# Patient Record
Sex: FEMALE | Race: WHITE
[De-identification: names, ages, dates, MRNs, and addresses within clinical notes are randomized per-mention and may not be internally consistent; named-entity substitution may affect disease eponyms.]

---

## 2017-05-17 ENCOUNTER — HOSPITAL ENCOUNTER (OUTPATIENT)
Dept: HOSPITAL 53 - M LAB | Age: 57
End: 2017-05-17
Attending: INTERNAL MEDICINE
Payer: OTHER GOVERNMENT

## 2017-05-17 DIAGNOSIS — R74.8: Primary | ICD-10-CM

## 2017-05-17 LAB
ALBUMIN SERPL BCG-MCNC: 4.3 GM/DL (ref 3.2–5.2)
ALBUMIN/GLOB SERPL: 1.39 {RATIO} (ref 1–1.93)
ALP SERPL-CCNC: 76 U/L (ref 45–117)
ALT SERPL W P-5'-P-CCNC: 40 U/L (ref 12–78)
AST SERPL-CCNC: 12 U/L (ref 15–37)
BILIRUB CONJ SERPL-MCNC: 0.1 MG/DL (ref 0–0.2)
BILIRUB SERPL-MCNC: 0.3 MG/DL (ref 0.2–1)
BUN SERPL-MCNC: 16 MG/DL (ref 7–18)
CREAT SERPL-MCNC: 0.61 MG/DL (ref 0.55–1.02)
GFR SERPL CREATININE-BSD FRML MDRD: > 60 ML/MIN/{1.73_M2} (ref 51–?)
GGT SERPL-CCNC: 66 U/L (ref 5–55)
INR PPP: 0.93
IRON SATN MFR SERPL: 12.1 % (ref 13.2–37.4)
PROT SERPL-MCNC: 7.4 GM/DL (ref 6.4–8.2)
TIBC SERPL-MCNC: 404 UG/DL (ref 250–450)

## 2017-05-22 ENCOUNTER — HOSPITAL ENCOUNTER (OUTPATIENT)
Dept: HOSPITAL 53 - M RAD | Age: 57
End: 2017-05-22
Attending: INTERNAL MEDICINE
Payer: OTHER GOVERNMENT

## 2017-05-22 DIAGNOSIS — R74.8: ICD-10-CM

## 2017-05-22 DIAGNOSIS — R93.3: Primary | ICD-10-CM

## 2017-05-22 PROCEDURE — 74178 CT ABD&PLV WO CNTR FLWD CNTR: CPT

## 2017-05-22 NOTE — REP
CT abdomen pelvis without and with IV contrast:  With oral contrast:

 

History:  Abnormal findings diagnostic imaging.  Elevated liver function

studies.

 

CT contrast dose:  100 mL of Isovue 370 is administered intravenously.

 

CT findings:  Preliminary digital  radiograph demonstrates an unremarkable

bowel gas pattern.  The lung bases are clear.  The liver and the spleen are

normal in size homogeneous in texture on pre and postcontrast CT acquisition.  No

adrenal lesion is seen on either side.  The kidneys enhance symmetrically and are

morphologically intact.  Renal delay images show no filling defect in the

collecting system on either side.  A normal appendix is seen in the right lower

abdomen.  No pancreatic abnormality is observed.  Small and large intestinal

bowel loops are unremarkable.  Uterus is tipped to the right and is unremarkable.

No pelvic mass, cyst, or adenopathy.  No bony destructive lesion is seen.  No

abdominal wall defect is observed.

 

Impression:

 

Negative CT abdomen and pelvis without and with IV contrast.  No hepatic

abnormality is seen.

 

 

Signed by

Rick Parker MD 05/23/2017 05:06 P

## 2018-02-21 ENCOUNTER — HOSPITAL ENCOUNTER (EMERGENCY)
Dept: HOSPITAL 53 - M ED | Age: 58
Discharge: HOME | End: 2018-02-21
Payer: COMMERCIAL

## 2018-02-21 DIAGNOSIS — N39.0: Primary | ICD-10-CM

## 2018-02-21 DIAGNOSIS — N13.4: ICD-10-CM

## 2018-02-21 DIAGNOSIS — E11.9: ICD-10-CM

## 2018-02-21 DIAGNOSIS — Z79.82: ICD-10-CM

## 2018-02-21 DIAGNOSIS — Z79.899: ICD-10-CM

## 2018-02-21 DIAGNOSIS — Z87.891: ICD-10-CM

## 2018-02-21 DIAGNOSIS — N13.2: ICD-10-CM

## 2018-02-21 LAB
ALBUMIN/GLOBULIN RATIO: 1.21 (ref 1–1.93)
ALBUMIN: 4.1 GM/DL (ref 3.2–5.2)
ALKALINE PHOSPHATASE: 73 U/L (ref 45–117)
ALT SERPL W P-5'-P-CCNC: 33 U/L (ref 12–78)
ANION GAP: 10 MEQ/L (ref 8–16)
AST SERPL-CCNC: 13 U/L (ref 7–37)
BASO #: 0.1 10^3/UL (ref 0–0.2)
BASO %: 0.6 % (ref 0–1)
BILIRUB CONJ SERPL-MCNC: < 0.1 MG/DL (ref 0–0.2)
BILIRUBIN,TOTAL: 0.3 MG/DL (ref 0.2–1)
BLOOD UREA NITROGEN: 14 MG/DL (ref 7–18)
CALCIUM LEVEL: 9.2 MG/DL (ref 8.5–10.1)
CARBON DIOXIDE LEVEL: 23 MEQ/L (ref 21–32)
CHLORIDE LEVEL: 107 MEQ/L (ref 98–107)
CREATININE FOR GFR: 0.92 MG/DL (ref 0.55–1.3)
EOS #: 0.2 10^3/UL (ref 0–0.5)
EOSINOPHIL NFR BLD AUTO: 1.8 % (ref 0–3)
GFR SERPL CREATININE-BSD FRML MDRD: > 60 ML/MIN/{1.73_M2} (ref 51–?)
GLUCOSE, FASTING: 243 MG/DL (ref 70–100)
HEMATOCRIT: 39.1 % (ref 36–47)
HEMOGLOBIN: 12.8 G/DL (ref 12–16)
IMMATURE GRANULOCYTE %: 0.2 % (ref 0–3)
LEUKOCYTE ESTERASE UR AUTO RFX: (no result)
LIPASE: 243 U/L (ref 73–393)
LYMPH #: 3.2 10^3/UL (ref 1.5–4.5)
LYMPH %: 36.7 % (ref 24–44)
MEAN CORPUSCULAR HEMOGLOBIN: 27.3 PG (ref 27–33)
MEAN CORPUSCULAR HGB CONC: 32.7 G/DL (ref 32–36.5)
MEAN CORPUSCULAR VOLUME: 83.4 FL (ref 80–96)
MONO #: 0.5 10^3/UL (ref 0–0.8)
MONO %: 5.7 % (ref 0–5)
NEUTROPHILS #: 4.8 10^3/UL (ref 1.8–7.7)
NEUTROPHILS %: 55 % (ref 36–66)
NRBC BLD AUTO-RTO: 0 % (ref 0–0)
PLATELET COUNT, AUTOMATED: 298 10^3/UL (ref 150–450)
POTASSIUM SERUM: 3.8 MEQ/L (ref 3.5–5.1)
RED BLOOD COUNT: 4.69 10^6/UL (ref 4–5.4)
RED CELL DISTRIBUTION WIDTH: 13 % (ref 11.5–14.5)
SODIUM LEVEL: 140 MEQ/L (ref 136–145)
SPECIFIC GRAVITY UR AUTO RFX: 1.02 (ref 1–1.03)
SQUAM EPITHELIAL CELL UR AURFX: 0 /HPF (ref 0–6)
TOTAL PROTEIN: 7.5 GM/DL (ref 6.4–8.2)
WHITE BLOOD COUNT: 8.7 10^3/UL (ref 4–10)

## 2018-02-21 RX ADMIN — KETOROLAC TROMETHAMINE 1 MG: 30 INJECTION, SOLUTION INTRAMUSCULAR at 17:31

## 2018-02-21 RX ADMIN — CEFTRIAXONE SODIUM 1 MLS/HR: 100 INJECTION, POWDER, FOR SOLUTION INTRAVENOUS at 20:09

## 2018-02-21 RX ADMIN — SODIUM CHLORIDE 1 MLS/HR: 9 INJECTION, SOLUTION INTRAVENOUS at 17:30

## 2018-06-15 ENCOUNTER — HOSPITAL ENCOUNTER (OUTPATIENT)
Dept: HOSPITAL 53 - M LAB REF | Age: 58
End: 2018-06-15
Attending: PHYSICIAN ASSISTANT
Payer: COMMERCIAL

## 2018-06-15 DIAGNOSIS — N39.0: Primary | ICD-10-CM

## 2018-06-15 LAB
APPEARANCE, URINE: (no result)
BACTERIA UR QL AUTO: (no result)
BILIRUBIN, URINE AUTO: NEGATIVE
BLOOD, URINE BLOOD: (no result)
GLUCOSE, URINE (UA) AUTO: (no result) MG/DL
KETONE, URINE AUTO: NEGATIVE MG/DL
LEUKOCYTE ESTERASE UR QL STRIP.AUTO: (no result)
NITRITE, URINE AUTO: POSITIVE
PH,URINE: 5 UNITS (ref 5–9)
PROT UR QL STRIP.AUTO: NEGATIVE MG/DL
RBC, URINE AUTO: 11 /HPF (ref 0–3)
SPECIFIC GRAVITY URINE AUTO: 1.02 (ref 1–1.03)
SQUAMOUS #/AREA URNS AUTO: 0 /HPF (ref 0–6)
UROBILINOGEN, URINE AUTO: 0.2 MG/DL (ref 0–2)
WBC, URINE AUTO: (no result) /HPF (ref 0–3)

## 2019-12-20 ENCOUNTER — HOSPITAL ENCOUNTER (OUTPATIENT)
Dept: HOSPITAL 53 - M LAB REF | Age: 59
End: 2019-12-20
Attending: PHYSICIAN ASSISTANT
Payer: COMMERCIAL

## 2019-12-20 DIAGNOSIS — R30.0: Primary | ICD-10-CM

## 2020-12-14 ENCOUNTER — HOSPITAL ENCOUNTER (OUTPATIENT)
Dept: HOSPITAL 53 - M LABSMTC | Age: 60
End: 2020-12-14
Attending: PEDIATRICS
Payer: SELF-PAY

## 2020-12-14 DIAGNOSIS — Z20.828: Primary | ICD-10-CM

## 2022-10-18 ENCOUNTER — HOSPITAL ENCOUNTER (OUTPATIENT)
Dept: HOSPITAL 53 - M WHC | Age: 62
End: 2022-10-18
Attending: STUDENT IN AN ORGANIZED HEALTH CARE EDUCATION/TRAINING PROGRAM
Payer: COMMERCIAL

## 2022-10-18 DIAGNOSIS — Z12.31: Primary | ICD-10-CM

## 2023-08-06 ENCOUNTER — HOSPITAL ENCOUNTER (EMERGENCY)
Dept: HOSPITAL 53 - M ED | Age: 63
LOS: 1 days | Discharge: HOME | End: 2023-08-07
Payer: COMMERCIAL

## 2023-08-06 VITALS — HEIGHT: 66 IN | BODY MASS INDEX: 30.58 KG/M2 | WEIGHT: 190.26 LBS

## 2023-08-06 DIAGNOSIS — K76.0: ICD-10-CM

## 2023-08-06 DIAGNOSIS — M54.50: ICD-10-CM

## 2023-08-06 DIAGNOSIS — E11.9: ICD-10-CM

## 2023-08-06 DIAGNOSIS — Z87.891: ICD-10-CM
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
## 2023-08-06 DIAGNOSIS — S00.81XA: ICD-10-CM

## 2023-08-06 DIAGNOSIS — S50.12XA: Primary | ICD-10-CM

## 2023-08-06 DIAGNOSIS — I25.10: ICD-10-CM

## 2023-08-06 DIAGNOSIS — R93.421: ICD-10-CM

## 2023-08-06 DIAGNOSIS — I10: ICD-10-CM

## 2023-08-06 DIAGNOSIS — R93.89: ICD-10-CM

## 2023-08-06 DIAGNOSIS — R16.0: ICD-10-CM

## 2023-08-06 DIAGNOSIS — Z79.82: ICD-10-CM

## 2023-08-06 DIAGNOSIS — Z79.899: ICD-10-CM

## 2023-08-06 DIAGNOSIS — M43.16: ICD-10-CM

## 2023-08-06 LAB
BASOPHILS # BLD AUTO: 0 10^3/UL (ref 0–0.2)
BASOPHILS NFR BLD AUTO: 0.4 % (ref 0–1)
EOSINOPHIL # BLD AUTO: 0 10^3/UL (ref 0–0.5)
EOSINOPHIL NFR BLD AUTO: 0.4 % (ref 0–3)
HCT VFR BLD AUTO: 39.6 % (ref 36–47)
HGB BLD-MCNC: 13.1 G/DL (ref 12–15.5)
LYMPHOCYTES # BLD AUTO: 2.6 10^3/UL (ref 1.5–5)
LYMPHOCYTES NFR BLD AUTO: 26.3 % (ref 24–44)
MCH RBC QN AUTO: 27.7 PG (ref 27–33)
MCHC RBC AUTO-ENTMCNC: 33.1 G/DL (ref 32–36.5)
MCV RBC AUTO: 83.7 FL (ref 80–96)
MONOCYTES # BLD AUTO: 0.6 10^3/UL (ref 0–0.8)
MONOCYTES NFR BLD AUTO: 6.3 % (ref 2–8)
NEUTROPHILS # BLD AUTO: 6.5 10^3/UL (ref 1.5–8.5)
NEUTROPHILS NFR BLD AUTO: 66.3 % (ref 36–66)
PLATELET # BLD AUTO: 312 10^3/UL (ref 150–450)
RBC # BLD AUTO: 4.73 10^6/UL (ref 4–5.4)
WBC # BLD AUTO: 9.8 10^3/UL (ref 4–10)

## 2023-08-06 PROCEDURE — 73090 X-RAY EXAM OF FOREARM: CPT

## 2023-08-06 PROCEDURE — 72125 CT NECK SPINE W/O DYE: CPT

## 2023-08-06 PROCEDURE — 85025 COMPLETE CBC W/AUTO DIFF WBC: CPT

## 2023-08-06 PROCEDURE — 93041 RHYTHM ECG TRACING: CPT

## 2023-08-06 PROCEDURE — 90715 TDAP VACCINE 7 YRS/> IM: CPT

## 2023-08-06 PROCEDURE — 73060 X-RAY EXAM OF HUMERUS: CPT

## 2023-08-06 PROCEDURE — 94760 N-INVAS EAR/PLS OXIMETRY 1: CPT

## 2023-08-06 PROCEDURE — 70450 CT HEAD/BRAIN W/O DYE: CPT

## 2023-08-06 PROCEDURE — 73130 X-RAY EXAM OF HAND: CPT

## 2023-08-06 PROCEDURE — 71260 CT THORAX DX C+: CPT

## 2023-08-06 PROCEDURE — 72131 CT LUMBAR SPINE W/O DYE: CPT

## 2023-08-06 PROCEDURE — 80047 BASIC METABLC PNL IONIZED CA: CPT

## 2023-08-06 PROCEDURE — 90471 IMMUNIZATION ADMIN: CPT

## 2023-08-06 PROCEDURE — 72128 CT CHEST SPINE W/O DYE: CPT

## 2023-08-06 PROCEDURE — 74177 CT ABD & PELVIS W/CONTRAST: CPT

## 2023-08-06 PROCEDURE — 99285 EMERGENCY DEPT VISIT HI MDM: CPT

## 2023-08-06 PROCEDURE — 73110 X-RAY EXAM OF WRIST: CPT

## 2023-08-07 VITALS — TEMPERATURE: 98.2 F | SYSTOLIC BLOOD PRESSURE: 146 MMHG | DIASTOLIC BLOOD PRESSURE: 77 MMHG | OXYGEN SATURATION: 97 %

## 2023-09-21 ENCOUNTER — HOSPITAL ENCOUNTER (OUTPATIENT)
Dept: HOSPITAL 53 - M PLARAD | Age: 63
End: 2023-09-21
Attending: FAMILY MEDICINE
Payer: COMMERCIAL

## 2023-09-21 DIAGNOSIS — D49.511: ICD-10-CM

## 2023-09-21 DIAGNOSIS — N28.89: Primary | ICD-10-CM

## 2023-10-06 ENCOUNTER — HOSPITAL ENCOUNTER (OUTPATIENT)
Dept: HOSPITAL 53 - M OPP | Age: 63
Discharge: HOME | End: 2023-10-06
Attending: SURGERY
Payer: COMMERCIAL

## 2023-10-06 VITALS — TEMPERATURE: 96.4 F

## 2023-10-06 VITALS — OXYGEN SATURATION: 98 % | SYSTOLIC BLOOD PRESSURE: 110 MMHG | DIASTOLIC BLOOD PRESSURE: 75 MMHG

## 2023-10-06 VITALS — WEIGHT: 185.2 LBS | HEIGHT: 66 IN | BODY MASS INDEX: 29.77 KG/M2

## 2023-10-06 DIAGNOSIS — Z79.4: ICD-10-CM

## 2023-10-06 DIAGNOSIS — Z79.890: ICD-10-CM

## 2023-10-06 DIAGNOSIS — Z87.891: ICD-10-CM

## 2023-10-06 DIAGNOSIS — K62.89: ICD-10-CM

## 2023-10-06 DIAGNOSIS — K63.5: Primary | ICD-10-CM

## 2023-10-06 DIAGNOSIS — Z79.02: ICD-10-CM

## 2023-10-06 DIAGNOSIS — Z79.899: ICD-10-CM

## 2023-10-26 ENCOUNTER — HOSPITAL ENCOUNTER (OUTPATIENT)
Dept: HOSPITAL 53 - M WHC | Age: 63
End: 2023-10-26
Attending: STUDENT IN AN ORGANIZED HEALTH CARE EDUCATION/TRAINING PROGRAM
Payer: COMMERCIAL

## 2023-10-26 DIAGNOSIS — Z12.31: Primary | ICD-10-CM

## 2024-10-30 ENCOUNTER — HOSPITAL ENCOUNTER (OUTPATIENT)
Dept: HOSPITAL 53 - M WHC | Age: 64
End: 2024-10-30
Attending: STUDENT IN AN ORGANIZED HEALTH CARE EDUCATION/TRAINING PROGRAM
Payer: COMMERCIAL

## 2024-10-30 DIAGNOSIS — R92.313: ICD-10-CM

## 2024-10-30 DIAGNOSIS — Z12.31: Primary | ICD-10-CM

## 2024-12-06 ENCOUNTER — HOSPITAL ENCOUNTER (OUTPATIENT)
Dept: HOSPITAL 53 - M SFHCRHEU | Age: 64
End: 2024-12-06
Attending: INTERNAL MEDICINE
Payer: COMMERCIAL

## 2024-12-06 DIAGNOSIS — M79.641: Primary | ICD-10-CM

## 2024-12-06 DIAGNOSIS — M54.50: ICD-10-CM

## 2024-12-06 DIAGNOSIS — M79.642: ICD-10-CM

## 2024-12-06 LAB
ALBUMIN SERPL BCG-MCNC: 3.9 G/DL (ref 3.2–5.2)
ALP SERPL-CCNC: 58 U/L (ref 35–104)
ALT SERPL W P-5'-P-CCNC: 24 U/L (ref 7–40)
AST SERPL-CCNC: 11 U/L (ref ?–34)
BASOPHILS # BLD AUTO: 0 10^3/UL (ref 0–0.2)
BASOPHILS NFR BLD AUTO: 0.6 % (ref 0–1)
BILIRUB SERPL-MCNC: 0.3 MG/DL (ref 0.3–1.2)
BUN SERPL-MCNC: 9 MG/DL (ref 9–23)
CALCIUM SERPL-MCNC: 9.8 MG/DL (ref 8.3–10.6)
CHLORIDE SERPL-SCNC: 109 MMOL/L (ref 98–107)
CO2 SERPL-SCNC: 26 MMOL/L (ref 20–31)
CREAT SERPL-MCNC: 0.5 MG/DL (ref 0.55–1.3)
CRP SERPL-MCNC: < 0.5 MG/DL (ref ?–1)
EOSINOPHIL # BLD AUTO: 0.1 10^3/UL (ref 0–0.5)
EOSINOPHIL NFR BLD AUTO: 0.7 % (ref 0–3)
ERYTHROCYTE [SEDIMENTATION RATE] IN BLOOD BY WESTERGREN METHOD: 10 MM/HR (ref 0–30)
GFR SERPL CREATININE-BSD FRML MDRD: > 60 ML/MIN/{1.73_M2} (ref 45–?)
GLUCOSE SERPL-MCNC: 89 MG/DL (ref 74–106)
HCT VFR BLD AUTO: 41.3 % (ref 36–47)
HGB BLD-MCNC: 13.2 G/DL (ref 12–15.5)
LYMPHOCYTES # BLD AUTO: 2.1 10^3/UL (ref 1.5–5)
LYMPHOCYTES NFR BLD AUTO: 29.7 % (ref 24–44)
MCH RBC QN AUTO: 27.7 PG (ref 27–33)
MCHC RBC AUTO-ENTMCNC: 32 G/DL (ref 32–36.5)
MCV RBC AUTO: 86.8 FL (ref 80–96)
MONOCYTES # BLD AUTO: 0.5 10^3/UL (ref 0–0.8)
MONOCYTES NFR BLD AUTO: 6.8 % (ref 2–8)
NEUTROPHILS # BLD AUTO: 4.3 10^3/UL (ref 1.5–8.5)
NEUTROPHILS NFR BLD AUTO: 61.9 % (ref 36–66)
PLATELET # BLD AUTO: 302 10^3/UL (ref 150–450)
POTASSIUM SERPL-SCNC: 4.1 MMOL/L (ref 3.5–5.1)
PROT SERPL-MCNC: 7.2 G/DL (ref 5.7–8.2)
RBC # BLD AUTO: 4.76 10^6/UL (ref 4–5.4)
SODIUM SERPL-SCNC: 143 MMOL/L (ref 136–145)
WBC # BLD AUTO: 6.9 10^3/UL (ref 4–10)

## 2025-01-20 ENCOUNTER — HOSPITAL ENCOUNTER (OUTPATIENT)
Dept: HOSPITAL 53 - M RAD | Age: 65
End: 2025-01-20
Attending: STUDENT IN AN ORGANIZED HEALTH CARE EDUCATION/TRAINING PROGRAM

## 2025-01-20 DIAGNOSIS — M53.3: Primary | ICD-10-CM

## 2025-01-20 DIAGNOSIS — N81.89: ICD-10-CM

## 2025-01-20 PROCEDURE — 72197 MRI PELVIS W/O & W/DYE: CPT

## 2025-04-21 ENCOUNTER — HOSPITAL ENCOUNTER (OUTPATIENT)
Dept: HOSPITAL 53 - M LAB | Age: 65
End: 2025-04-21
Attending: NURSE PRACTITIONER
Payer: COMMERCIAL

## 2025-04-21 DIAGNOSIS — R19.09: Primary | ICD-10-CM

## 2025-04-21 LAB
ALBUMIN SERPL BCG-MCNC: 4.1 G/DL (ref 3.2–5.2)
ALP SERPL-CCNC: 65 U/L (ref 35–104)
ALT SERPL W P-5'-P-CCNC: 23 U/L (ref 7–40)
AST SERPL-CCNC: 13 U/L (ref ?–34)
BILIRUB SERPL-MCNC: 0.4 MG/DL (ref 0.3–1.2)
BUN SERPL-MCNC: 12 MG/DL (ref 9–23)
CALCIUM SERPL-MCNC: 9.3 MG/DL (ref 8.3–10.6)
CHLORIDE SERPL-SCNC: 106 MMOL/L (ref 98–107)
CO2 SERPL-SCNC: 24 MMOL/L (ref 20–31)
CREAT SERPL-MCNC: 0.54 MG/DL (ref 0.55–1.3)
GFR SERPL CREATININE-BSD FRML MDRD: > 90 ML/MIN/{1.73_M2} (ref 45–?)
GLUCOSE SERPL-MCNC: 159 MG/DL (ref 74–106)
POTASSIUM SERPL-SCNC: 4.5 MMOL/L (ref 3.5–5.1)
PROT SERPL-MCNC: 7.1 G/DL (ref 5.7–8.2)
SODIUM SERPL-SCNC: 141 MMOL/L (ref 136–145)

## 2025-05-06 ENCOUNTER — HOSPITAL ENCOUNTER (OUTPATIENT)
Dept: HOSPITAL 53 - M RAD | Age: 65
End: 2025-05-06
Attending: STUDENT IN AN ORGANIZED HEALTH CARE EDUCATION/TRAINING PROGRAM
Payer: COMMERCIAL

## 2025-05-06 DIAGNOSIS — R19.09: Primary | ICD-10-CM

## 2025-05-06 DIAGNOSIS — K57.90: ICD-10-CM

## 2025-05-06 PROCEDURE — 74177 CT ABD & PELVIS W/CONTRAST: CPT
